# Patient Record
Sex: FEMALE | Race: WHITE
[De-identification: names, ages, dates, MRNs, and addresses within clinical notes are randomized per-mention and may not be internally consistent; named-entity substitution may affect disease eponyms.]

---

## 2020-03-08 ENCOUNTER — HOSPITAL ENCOUNTER (EMERGENCY)
Dept: HOSPITAL 25 - UCCORT | Age: 70
Discharge: HOME | End: 2020-03-08
Payer: MEDICARE

## 2020-03-08 VITALS — DIASTOLIC BLOOD PRESSURE: 72 MMHG | SYSTOLIC BLOOD PRESSURE: 155 MMHG
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
## 2020-03-08 DIAGNOSIS — Z88.1: ICD-10-CM

## 2020-03-08 DIAGNOSIS — S61.411A: Primary | ICD-10-CM

## 2020-03-08 PROCEDURE — 12002 RPR S/N/AX/GEN/TRNK2.6-7.5CM: CPT

## 2020-03-08 PROCEDURE — 99211 OFF/OP EST MAY X REQ PHY/QHP: CPT

## 2020-03-08 PROCEDURE — G0463 HOSPITAL OUTPT CLINIC VISIT: HCPCS

## 2020-03-08 NOTE — UC
Laceration HPI





- HPI Summary


HPI Summary: 





68 yo WF presents with right thenar eminence 4cm laceration on broken smoothie 

glass that broke on granite countertop





- History Of Current Complaint


Chief Complaint: UCLaceration


Stated Complaint: RT HAND LAC


Time Seen by Provider: 03/08/20 15:23


Hx Obtained From: Patient


Laceration Location: Hand - right thenar eminence


Mechanism Of Injury: Sharp Trauma


Onset/Duration: Sudden Onset


Severity: Moderate


Pain Intensity: 1


Aggravating Factors: Nothing





- Allergies/Home Medications


Allergies/Adverse Reactions: 


 Allergies











Allergy/AdvReac Type Severity Reaction Status Date / Time


 


azithromycin Allergy  Hives Verified 03/08/20 15:30


 


nitrofurantoin Allergy  Hives Verified 03/08/20 15:30











Home Medications: 


 Home Medications





Escitalopram * [Lexapro *] 20 mg PO DAILY 03/08/20 [History Confirmed 03/08/20]


Naproxen Sodium [Aleve] 440 mg PO QPM 03/08/20 [History Confirmed 03/08/20]


Quetiapine Fumarate [Seroquel 50 mg tab] 50 mg PO QPM 03/08/20 [History 

Confirmed 03/08/20]











PMH/Surg Hx/FS Hx/Imm Hx


Previously Healthy: Yes





- Surgical History


Surgical History: Yes


Surgery Procedure, Year, and Place: 2 c-sections; right wrist, right shoulder, 

left knee- last surgery was the left knee 2/2018





- Family History


Known Family History: Positive: Hypertension, Non-Contributory





- Social History


Alcohol Use: None


Substance Use Type: None


Smoking Status (MU): Never Smoked Tobacco


When Did the Patient Quit Smoking/Using Tobacco: college





- Immunization History


Most Recent Tetanus Shot: may be UTD





Review of Systems


All Other Systems Reviewed And Are Negative: Yes


Constitutional: Positive: Negative


Skin: Positive: Other - see HPI


Eyes: Positive: Negative


ENT: Positive: Negative


Respiratory: Positive: Negative


Cardiovascular: Positive: Negative


Gastrointestinal: Positive: Negative


Genitourinary: Positive: Negative


Motor: Positive: Negative


Neurovascular: Positive: Negative


Neurological/Mental Status: Positive: Negative


Psychological: Positive: Negative


Is Patient Immunocompromised?: No





Physical Exam





- Summary


Physical Exam Summary: 


Vital Signs Reviewed: Yes


Appearance: Positive: No Pain Distress


Skin: Positive: Warm, jagged 4cm laceration on right thenar eminence, slowly 

oozing blood, nVI, ROM intact


Head/Face: Positive: Normal Head/Face Inspection


Eyes: Positive: Normal


ENT: Positive: Normal ENT inspection


Dental: Negative: Cervical Lymphadenopathy


Neck: Positive: Supple


Respiratory/Lung Sounds: Positive: Clear to Auscultation


Cardiovascular: Positive: Normal, RRR, S1, S2


Abdomen Description: Positive: Nontender


Musculoskeletal: Positive: Normal


Neurological: Positive: Normal


Psychiatric: Positive: Normal


Triage Information Reviewed: Yes


Vital Signs: 


 Initial Vital Signs











Temp  36.6 C   03/08/20 15:18


 


Pulse  62   03/08/20 15:18


 


Resp  18   03/08/20 15:18


 


BP  155/72   03/08/20 15:18


 


Pulse Ox  100   03/08/20 15:18














Laceration Repair





- Laceration Repair


  ** 7


Description: Linear


Laceration Size After Repair: Length (cm) - 4


Modified For Repair: No


Type Injection: Local


Anesthesia Used: 0.25% Marcaine


Cleansing Completed Via Routine Prep: Yes


Irrigation With Pressure Irrigation Device: No


Closure Material: Sutures


Closure Method: Single Layer


Suture Of: Skin


Suture Type: Prolene - 7 sutures placed





Laceration Course/Dx





- Differential Dx - Laceration/Wound


Differental Diagnoses: Avulsion, Laceration





- Diagnosis


Provider Diagnosis: 


 Hand laceration








Discharge ED





- Sign-Out/Discharge


Documenting (check all that apply): Patient Departure


All imaging exams completed and their final reports reviewed: No Studies





- Discharge Plan


Condition: Stable


Disposition: HOME


Patient Education Materials:  Care For Your Stitches (ED), Laceration (ED), 

Finger Laceration (ED)


Referrals: 


Rika Landrum MD [Primary Care Provider] - 


Additional Instructions: 


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 

sutures in 10 days





- Billing Disposition and Condition


Condition: STABLE


Disposition: Home